# Patient Record
Sex: FEMALE | Race: WHITE
[De-identification: names, ages, dates, MRNs, and addresses within clinical notes are randomized per-mention and may not be internally consistent; named-entity substitution may affect disease eponyms.]

---

## 2020-11-23 ENCOUNTER — HOSPITAL ENCOUNTER (INPATIENT)
Dept: HOSPITAL 47 - EC | Age: 75
LOS: 2 days | Discharge: HOME | DRG: 641 | End: 2020-11-25
Attending: INTERNAL MEDICINE | Admitting: INTERNAL MEDICINE
Payer: MEDICARE

## 2020-11-23 DIAGNOSIS — T50.2X5A: ICD-10-CM

## 2020-11-23 DIAGNOSIS — Z88.8: ICD-10-CM

## 2020-11-23 DIAGNOSIS — N17.9: ICD-10-CM

## 2020-11-23 DIAGNOSIS — Z82.3: ICD-10-CM

## 2020-11-23 DIAGNOSIS — Z88.2: ICD-10-CM

## 2020-11-23 DIAGNOSIS — Z79.899: ICD-10-CM

## 2020-11-23 DIAGNOSIS — Z79.82: ICD-10-CM

## 2020-11-23 DIAGNOSIS — I95.1: ICD-10-CM

## 2020-11-23 DIAGNOSIS — E86.1: ICD-10-CM

## 2020-11-23 DIAGNOSIS — E87.1: Primary | ICD-10-CM

## 2020-11-23 DIAGNOSIS — E86.0: ICD-10-CM

## 2020-11-23 DIAGNOSIS — Z82.49: ICD-10-CM

## 2020-11-23 DIAGNOSIS — Z20.828: ICD-10-CM

## 2020-11-23 DIAGNOSIS — E78.5: ICD-10-CM

## 2020-11-23 DIAGNOSIS — N18.9: ICD-10-CM

## 2020-11-23 DIAGNOSIS — I12.9: ICD-10-CM

## 2020-11-23 LAB
ALBUMIN SERPL-MCNC: 5.2 G/DL (ref 3.5–5)
ALP SERPL-CCNC: 78 U/L (ref 38–126)
ALT SERPL-CCNC: 29 U/L (ref 4–34)
ANION GAP SERPL CALC-SCNC: 14 MMOL/L
ANION GAP SERPL CALC-SCNC: 8 MMOL/L
AST SERPL-CCNC: 34 U/L (ref 14–36)
BASOPHILS # BLD AUTO: 0 K/UL (ref 0–0.2)
BASOPHILS NFR BLD AUTO: 1 %
BUN SERPL-SCNC: 20 MG/DL (ref 7–17)
BUN SERPL-SCNC: 25 MG/DL (ref 7–17)
CALCIUM SPEC-MCNC: 10.5 MG/DL (ref 8.4–10.2)
CALCIUM SPEC-MCNC: 9.6 MG/DL (ref 8.4–10.2)
CHLORIDE SERPL-SCNC: 79 MMOL/L (ref 98–107)
CHLORIDE SERPL-SCNC: 88 MMOL/L (ref 98–107)
CO2 SERPL-SCNC: 22 MMOL/L (ref 22–30)
CO2 SERPL-SCNC: 25 MMOL/L (ref 22–30)
EOSINOPHIL # BLD AUTO: 0.1 K/UL (ref 0–0.7)
EOSINOPHIL NFR BLD AUTO: 1 %
ERYTHROCYTE [DISTWIDTH] IN BLOOD BY AUTOMATED COUNT: 3.98 M/UL (ref 3.8–5.4)
ERYTHROCYTE [DISTWIDTH] IN BLOOD: 11.4 % (ref 11.5–15.5)
GLUCOSE SERPL-MCNC: 109 MG/DL (ref 74–99)
GLUCOSE SERPL-MCNC: 134 MG/DL (ref 74–99)
HCT VFR BLD AUTO: 37.7 % (ref 34–46)
HGB BLD-MCNC: 13.5 GM/DL (ref 11.4–16)
INR PPP: 1 (ref ?–1.2)
KETONES UR QL STRIP.AUTO: (no result)
LYMPHOCYTES # SPEC AUTO: 0.9 K/UL (ref 1–4.8)
LYMPHOCYTES NFR SPEC AUTO: 16 %
MCH RBC QN AUTO: 33.9 PG (ref 25–35)
MCHC RBC AUTO-ENTMCNC: 35.8 G/DL (ref 31–37)
MCV RBC AUTO: 94.8 FL (ref 80–100)
MONOCYTES # BLD AUTO: 0.6 K/UL (ref 0–1)
MONOCYTES NFR BLD AUTO: 10 %
NEUTROPHILS # BLD AUTO: 4 K/UL (ref 1.3–7.7)
NEUTROPHILS NFR BLD AUTO: 71 %
PH UR: 5.5 [PH] (ref 5–8)
PLATELET # BLD AUTO: 269 K/UL (ref 150–450)
POTASSIUM SERPL-SCNC: 4.1 MMOL/L (ref 3.5–5.1)
POTASSIUM SERPL-SCNC: 4.7 MMOL/L (ref 3.5–5.1)
PROT SERPL-MCNC: 8.2 G/DL (ref 6.3–8.2)
PT BLD: 10 SEC (ref 9–12)
SODIUM SERPL-SCNC: 115 MMOL/L (ref 137–145)
SODIUM SERPL-SCNC: 121 MMOL/L (ref 137–145)
SP GR UR: 1 (ref 1–1.03)
UROBILINOGEN UR QL STRIP: <2 MG/DL (ref ?–2)
WBC # BLD AUTO: 5.6 K/UL (ref 3.8–10.6)

## 2020-11-23 PROCEDURE — 81003 URINALYSIS AUTO W/O SCOPE: CPT

## 2020-11-23 PROCEDURE — 80048 BASIC METABOLIC PNL TOTAL CA: CPT

## 2020-11-23 PROCEDURE — 83050 HGB METHEMOGLOBIN QUAN: CPT

## 2020-11-23 PROCEDURE — 84443 ASSAY THYROID STIM HORMONE: CPT

## 2020-11-23 PROCEDURE — 84300 ASSAY OF URINE SODIUM: CPT

## 2020-11-23 PROCEDURE — 84295 ASSAY OF SERUM SODIUM: CPT

## 2020-11-23 PROCEDURE — 83735 ASSAY OF MAGNESIUM: CPT

## 2020-11-23 PROCEDURE — 85025 COMPLETE CBC W/AUTO DIFF WBC: CPT

## 2020-11-23 PROCEDURE — 87635 SARS-COV-2 COVID-19 AMP PRB: CPT

## 2020-11-23 PROCEDURE — 84484 ASSAY OF TROPONIN QUANT: CPT

## 2020-11-23 PROCEDURE — 87502 INFLUENZA DNA AMP PROBE: CPT

## 2020-11-23 PROCEDURE — 80053 COMPREHEN METABOLIC PANEL: CPT

## 2020-11-23 PROCEDURE — 83930 ASSAY OF BLOOD OSMOLALITY: CPT

## 2020-11-23 PROCEDURE — 99285 EMERGENCY DEPT VISIT HI MDM: CPT

## 2020-11-23 PROCEDURE — 96361 HYDRATE IV INFUSION ADD-ON: CPT

## 2020-11-23 PROCEDURE — 84550 ASSAY OF BLOOD/URIC ACID: CPT

## 2020-11-23 PROCEDURE — 83935 ASSAY OF URINE OSMOLALITY: CPT

## 2020-11-23 PROCEDURE — 36415 COLL VENOUS BLD VENIPUNCTURE: CPT

## 2020-11-23 PROCEDURE — 85610 PROTHROMBIN TIME: CPT

## 2020-11-23 PROCEDURE — 71046 X-RAY EXAM CHEST 2 VIEWS: CPT

## 2020-11-23 PROCEDURE — 96360 HYDRATION IV INFUSION INIT: CPT

## 2020-11-23 PROCEDURE — 93005 ELECTROCARDIOGRAM TRACING: CPT

## 2020-11-23 RX ADMIN — EZETIMIBE SCH MG: 10 TABLET ORAL at 21:14

## 2020-11-23 RX ADMIN — ASPIRIN 325 MG ORAL TABLET SCH MG: 325 PILL ORAL at 21:14

## 2020-11-23 RX ADMIN — METOPROLOL SUCCINATE SCH MG: 25 TABLET, EXTENDED RELEASE ORAL at 14:41

## 2020-11-23 RX ADMIN — HEPARIN SODIUM SCH UNIT: 5000 INJECTION, SOLUTION INTRAVENOUS; SUBCUTANEOUS at 21:16

## 2020-11-23 NOTE — ED
General Adult HPI





- General


Chief complaint: Dizziness


Stated complaint: Heart Issues/SOB


Time Seen by Provider: 11/23/20 10:51


Source: patient, RN notes reviewed


Mode of arrival: ambulatory


Limitations: no limitations





- History of Present Illness


Initial comments: 





75-year-old female presents to the emergency department for chief complaint of 

weakness.  Patient states she has felt fatigued and weak for the past several 

days.  Patient reports that because she felt so tired she checked her blood 

pressure and it has been low.  She reports it is in the 60s systolic at home.  

Patient states she has been getting slightly lightheaded when standing up.  She 

denies nausea or vomiting.  Denies patient of a room spinning.  She denies 

fevers or chills.  No cough congestion sore throat.  Patient denies any cardiac 

history herself.  Patient reports she did not take her blood pressure medication

this morning which includes a beta blocker and lisinopril.  Patient has no other

complaints at this time including shortness of breath, chest pain, abdominal carter

n, nausea or vomiting, headache, or visual changes.





- Related Data


                                Home Medications











 Medication  Instructions  Recorded  Confirmed


 


Aspirin 325 mg PO HS 11/23/20 11/23/20


 


Ezetimibe [Zetia] 10 mg PO HS 11/23/20 11/23/20


 


Lisinopril-Hctz 10-12.5 mg 1 tab PO DAILY 11/23/20 11/23/20





[Zestoretic 10-12.5]   


 


Metoprolol Succinate (ER) [Toprol 25 mg PO DAILY 11/23/20 11/23/20





Xl]   











                                    Allergies











Allergy/AdvReac Type Severity Reaction Status Date / Time


 


Statins-Hmg-Coa Reductase AdvReac  muscle Verified 11/23/20 12:50





Inhibitor   cramps  


 


Sulfa (Sulfonamide AdvReac  Nausea Verified 11/23/20 12:50





Antibiotics)     














Review of Systems


ROS Statement: 


Those systems with pertinent positive or pertinent negative responses have been 

documented in the HPI.





ROS Other: All systems not noted in ROS Statement are negative.





Past Medical History


Past Medical History: Hyperlipidemia, Hypertension


History of Any Multi-Drug Resistant Organisms: None Reported


Past Surgical History: No Surgical Hx Reported


Past Psychological History: No Psychological Hx Reported


Smoking Status: Never smoker


Past Alcohol Use History: Occasional


Past Drug Use History: None Reported





General Exam


Limitations: no limitations


General appearance: alert, in no apparent distress


Head exam: Present: atraumatic, normocephalic, normal inspection


Eye exam: Present: normal appearance, PERRL, EOMI.  Absent: scleral icterus, 

conjunctival injection, periorbital swelling


ENT exam: Present: normal exam, mucous membranes moist


Neck exam: Present: normal inspection, full ROM.  Absent: tenderness, 

meningismus, lymphadenopathy


Respiratory exam: Present: normal lung sounds bilaterally.  Absent: respiratory 

distress, wheezes, rales, rhonchi, stridor


Cardiovascular Exam: Present: regular rate, normal rhythm, normal heart sounds. 

Absent: systolic murmur, diastolic murmur, rubs, gallop, clicks


GI/Abdominal exam: Present: soft, normal bowel sounds.  Absent: distended, 

tenderness, guarding, rebound, rigid





Course


                                   Vital Signs











  11/23/20 11/23/20





  10:42 11:30


 


Temperature 97.3 F L 


 


Pulse Rate 74 


 


Pulse Rate [  65





Pulse Oximetery  





]  


 


Respiratory 18 





Rate  


 


Blood Pressure 143/84 


 


Blood Pressure  132/71





[Left Arm  





Sitting]  


 


Blood Pressure  116/75





[Left Arm  





Standing]  


 


Blood Pressure  151/76





[Left Arm  





Supine]  


 


O2 Sat by Pulse 100 96





Oximetry  














EKG Findings





- EKG Comments:


EKG Findings:: Normal sinus rhythm, ventricular rate 65, HI interval 134, QTC 

405





Medical Decision Making





- Medical Decision Making





Vitals are stable, orthostatic blood pressures are normal.  CBC is unremarkable.

 CMP does show severe hyponatremia with a sodium of 1:15.  Patient reports she 

has been drinking a lot of water and has been very thirsty.  States she has not 

otherwise had an appetite.  She does not have diarrhea.  At this time patient 

will be admitted for hyponatremia.  She was given a 500 mL bolus here in the 

emergency room.  It was ordered as a liter however stopped half way through 1 

sodium resulted.  She will be gently rehydrated with 75 mL of normal saline per 

hour





- Lab Data


Result diagrams: 


                                 11/23/20 11:20 11/23/20 11:20


                                   Lab Results











  11/23/20 11/23/20 11/23/20 Range/Units





  11:20 11:20 11:20 


 


WBC  5.6    (3.8-10.6)  k/uL


 


RBC  3.98    (3.80-5.40)  m/uL


 


Hgb  13.5    (11.4-16.0)  gm/dL


 


Hct  37.7    (34.0-46.0)  %


 


MCV  94.8    (80.0-100.0)  fL


 


MCH  33.9    (25.0-35.0)  pg


 


MCHC  35.8    (31.0-37.0)  g/dL


 


RDW  11.4 L    (11.5-15.5)  %


 


Plt Count  269    (150-450)  k/uL


 


MPV  6.7    


 


Neutrophils %  71    %


 


Lymphocytes %  16    %


 


Monocytes %  10    %


 


Eosinophils %  1    %


 


Basophils %  1    %


 


Neutrophils #  4.0    (1.3-7.7)  k/uL


 


Lymphocytes #  0.9 L    (1.0-4.8)  k/uL


 


Monocytes #  0.6    (0-1.0)  k/uL


 


Eosinophils #  0.1    (0-0.7)  k/uL


 


Basophils #  0.0    (0-0.2)  k/uL


 


PT   10.0   (9.0-12.0)  sec


 


INR   1.0   (<1.2)  


 


Sodium    115 L*  (137-145)  mmol/L


 


Potassium    4.7  (3.5-5.1)  mmol/L


 


Chloride    79 L  ()  mmol/L


 


Carbon Dioxide    22  (22-30)  mmol/L


 


Anion Gap    14  mmol/L


 


BUN    25 H  (7-17)  mg/dL


 


Creatinine    1.37 H  (0.52-1.04)  mg/dL


 


Est GFR (CKD-EPI)AfAm    44  (>60 ml/min/1.73 sqM)  


 


Est GFR (CKD-EPI)NonAf    38  (>60 ml/min/1.73 sqM)  


 


Glucose    109 H  (74-99)  mg/dL


 


Calcium    10.5 H  (8.4-10.2)  mg/dL


 


Total Bilirubin    1.1  (0.2-1.3)  mg/dL


 


AST    34  (14-36)  U/L


 


ALT    29  (4-34)  U/L


 


Alkaline Phosphatase    78  ()  U/L


 


Troponin I     (0.000-0.034)  ng/mL


 


Total Protein    8.2  (6.3-8.2)  g/dL


 


Albumin    5.2 H  (3.5-5.0)  g/dL














  11/23/20 Range/Units





  11:20 


 


WBC   (3.8-10.6)  k/uL


 


RBC   (3.80-5.40)  m/uL


 


Hgb   (11.4-16.0)  gm/dL


 


Hct   (34.0-46.0)  %


 


MCV   (80.0-100.0)  fL


 


MCH   (25.0-35.0)  pg


 


MCHC   (31.0-37.0)  g/dL


 


RDW   (11.5-15.5)  %


 


Plt Count   (150-450)  k/uL


 


MPV   


 


Neutrophils %   %


 


Lymphocytes %   %


 


Monocytes %   %


 


Eosinophils %   %


 


Basophils %   %


 


Neutrophils #   (1.3-7.7)  k/uL


 


Lymphocytes #   (1.0-4.8)  k/uL


 


Monocytes #   (0-1.0)  k/uL


 


Eosinophils #   (0-0.7)  k/uL


 


Basophils #   (0-0.2)  k/uL


 


PT   (9.0-12.0)  sec


 


INR   (<1.2)  


 


Sodium   (137-145)  mmol/L


 


Potassium   (3.5-5.1)  mmol/L


 


Chloride   ()  mmol/L


 


Carbon Dioxide   (22-30)  mmol/L


 


Anion Gap   mmol/L


 


BUN   (7-17)  mg/dL


 


Creatinine   (0.52-1.04)  mg/dL


 


Est GFR (CKD-EPI)AfAm   (>60 ml/min/1.73 sqM)  


 


Est GFR (CKD-EPI)NonAf   (>60 ml/min/1.73 sqM)  


 


Glucose   (74-99)  mg/dL


 


Calcium   (8.4-10.2)  mg/dL


 


Total Bilirubin   (0.2-1.3)  mg/dL


 


AST   (14-36)  U/L


 


ALT   (4-34)  U/L


 


Alkaline Phosphatase   ()  U/L


 


Troponin I  <0.012  (0.000-0.034)  ng/mL


 


Total Protein   (6.3-8.2)  g/dL


 


Albumin   (3.5-5.0)  g/dL














Disposition


Clinical Impression: 


 Hyponatremia, Weakness





Disposition: ADMITTED AS IP TO THIS HOSP


Condition: Fair


Is patient prescribed a controlled substance at d/c from ED?: No


Referrals: 


Karie Flanagan MD [Primary Care Provider] - 1-2 days


Time of Disposition: 12:53

## 2020-11-23 NOTE — P.HPIM
History of Present Illness


H&P Date: 11/23/20


Chief Complaint: Generalized weakness





This is a 75-year-old female with past medical history noted below who presented

to the emergency room with generalized weakness.  Patient said that this is 

being going on for a few days as she's been feeling tired and fatigued.  She den

ies any fever or chills.  No flulike symptoms.  She is having a very poor 

appetite and barely eating as much.  She said that she tried to drink a lot of 

water to keep herself hydrated.  She denies any recent sick contact.  No known 

covid19 exposure.  Patient reports feeling slightly dizzy but otherwise denies 

any chest pain, nausea or vomiting, or diarrhea.  She denies alcohol drinking.  

Patient was evaluated in the car was found to have evidence of dehydration with 

a sodium level of 1:15.  She will be admitted to the hospital for further 

management.





Review of Systems





Review of system:


14 points review of systems were obtained and were negative except to what were 

mentioned in the HPI.





Past Medical History


Past Medical History: Hyperlipidemia, Hypertension


History of Any Multi-Drug Resistant Organisms: None Reported


Past Surgical History: No Surgical Hx Reported


Past Psychological History: No Psychological Hx Reported


Smoking Status: Never smoker


Past Alcohol Use History: Occasional


Past Drug Use History: None Reported





Medications and Allergies


                                Home Medications











 Medication  Instructions  Recorded  Confirmed  Type


 


Aspirin 325 mg PO HS 11/23/20 11/23/20 History


 


Ezetimibe [Zetia] 10 mg PO HS 11/23/20 11/23/20 History


 


Lisinopril-Hctz 10-12.5 mg 1 tab PO DAILY 11/23/20 11/23/20 History





[Zestoretic 10-12.5]    


 


Metoprolol Succinate (ER) [Toprol 25 mg PO DAILY 11/23/20 11/23/20 History





Xl]    








                                    Allergies











Allergy/AdvReac Type Severity Reaction Status Date / Time


 


Statins-Hmg-Coa Reductase AdvReac  muscle Verified 11/23/20 12:50





Inhibitor   cramps  


 


Sulfa (Sulfonamide AdvReac  Nausea Verified 11/23/20 12:50





Antibiotics)     














Physical Exam


Vitals: 


                                   Vital Signs











  Temp Pulse Pulse Resp BP BP BP


 


 11/23/20 13:51   65   18  138/74  


 


 11/23/20 12:57   63   18  147/70  


 


 11/23/20 11:30    65    132/71  116/75


 


 11/23/20 10:42  97.3 F L  74   18  143/84  














  BP Pulse Ox


 


 11/23/20 13:51   100


 


 11/23/20 12:57   98


 


 11/23/20 11:30  151/76  96


 


 11/23/20 10:42   100








                                Intake and Output











 11/22/20 11/23/20 11/23/20





 22:59 06:59 14:59


 


Other:   


 


  Weight   56.245 kg














General:  The patient is awake and alert, in no distress


Eye: there is normal conjunctiva bilaterally.  


Neck:  The neck is supple, there is no  JVD.  


Cardiovascular:  Normal  S1-S2, no S3-S4, no murmurs.


Respiratory: Lungs clear to auscultation bilaterally


Gastrointestinal: Abdomen is soft, nontender


Musculoskeletal:  There is no pedal edema.  


Neurological:. Speech is normal. 


Skin:  Skin is warm and dry 





Results


CBC & Chem 7: 


                                 11/23/20 11:20





                                 11/23/20 11:20


Labs: 


                  Abnormal Lab Results - Last 24 Hours (Table)











  11/23/20 11/23/20 11/23/20 Range/Units





  11:20 11:20 12:50 


 


RDW  11.4 L    (11.5-15.5)  %


 


Lymphocytes #  0.9 L    (1.0-4.8)  k/uL


 


Sodium   115 L*   (137-145)  mmol/L


 


Chloride   79 L   ()  mmol/L


 


BUN   25 H   (7-17)  mg/dL


 


Creatinine   1.37 H   (0.52-1.04)  mg/dL


 


Glucose   109 H   (74-99)  mg/dL


 


Calcium   10.5 H   (8.4-10.2)  mg/dL


 


Albumin   5.2 H   (3.5-5.0)  g/dL


 


Urine Ketones    1+ H  (Negative)  














Assessment and Plan


Assessment: 





1.  Hypovolemic hyponatremia: Secondary to dehydration and hydrochlorothiazide 

use.  Received 1 L of normal saline in the ER.  Continue normal saline at 75 mL 

per hour.  Consult nephrology for further evaluation.  Repeat BMP every 4 hours.

 Seizure precaution.





2.  Acute kidney injury, mild





3.  Essential hypertension: Blood pressure within acceptable range.  Discontinue

hydrochlorothiazide.  Hold lisinopril.  Continue metoprolol for now.





4.  Mild orthostatic hypotension on presentation





5.  DVT prophylaxis with subcu heparin











The patient is admitted with an anticipated greater than 2 midnight stay for 

evaluation of the medical problems noted above


Discussed with: Patient and nursing staff


Anticipated discharge date: To be determined based on clinical course


Anticipated discharge place: home


A total of 45 minutes was spent on the care of this complex patient more than 

50% of the time was spent in counseling and care coordination.

## 2020-11-24 LAB
ANION GAP SERPL CALC-SCNC: 9 MMOL/L
BUN SERPL-SCNC: 21 MG/DL (ref 7–17)
CALCIUM SPEC-MCNC: 9.5 MG/DL (ref 8.4–10.2)
CHLORIDE SERPL-SCNC: 90 MMOL/L (ref 98–107)
CO2 SERPL-SCNC: 24 MMOL/L (ref 22–30)
GLUCOSE SERPL-MCNC: 92 MG/DL (ref 74–99)
MAGNESIUM SPEC-SCNC: 2.1 MG/DL (ref 1.6–2.3)
POTASSIUM SERPL-SCNC: 4.1 MMOL/L (ref 3.5–5.1)
SODIUM SERPL-SCNC: 123 MMOL/L (ref 137–145)
URATE SERPL-MCNC: 7 MG/DL (ref 3.7–7.4)

## 2020-11-24 RX ADMIN — ASPIRIN 325 MG ORAL TABLET SCH MG: 325 PILL ORAL at 20:11

## 2020-11-24 RX ADMIN — METOPROLOL SUCCINATE SCH MG: 25 TABLET, EXTENDED RELEASE ORAL at 07:52

## 2020-11-24 RX ADMIN — HEPARIN SODIUM SCH UNIT: 5000 INJECTION, SOLUTION INTRAVENOUS; SUBCUTANEOUS at 20:11

## 2020-11-24 RX ADMIN — EZETIMIBE SCH MG: 10 TABLET ORAL at 20:11

## 2020-11-24 RX ADMIN — HEPARIN SODIUM SCH: 5000 INJECTION, SOLUTION INTRAVENOUS; SUBCUTANEOUS at 07:51

## 2020-11-24 NOTE — P.PN
Subjective


Progress Note Date: 11/24/20


Principal diagnosis: 





Feels better less fatigue





Objective





- Vital Signs


Vital signs: 


                                   Vital Signs











Temp  97.9 F   11/24/20 12:00


 


Pulse  65   11/24/20 12:00


 


Resp  17   11/24/20 12:00


 


BP  138/70   11/24/20 12:00


 


Pulse Ox  99   11/24/20 12:00








                                 Intake & Output











 11/23/20 11/24/20 11/24/20





 18:59 06:59 18:59


 


Weight 56.245 kg  


 


Other:   


 


  Voiding Method Toilet Toilet Toilet


 


  # Voids 2 1 


 


  # Bowel Movements 1  














- Constitutional


General appearance: Present: no acute distress





- Respiratory


Respiratory: bilateral: CTA





- Cardiovascular


Rhythm: regular





- Gastrointestinal


General gastrointestinal: Present: normal bowel sounds





- Integumentary


Integumentary: Present: normal





- Labs


CBC & Chem 7: 


                                 11/23/20 11:20





                                 11/24/20 12:29


Labs: 


                  Abnormal Lab Results - Last 24 Hours (Table)











  11/23/20 11/23/20 11/24/20 Range/Units





  20:22 20:22 01:07 


 


Sodium   121 L  121 L  (137-145)  mmol/L


 


Chloride   88 L   ()  mmol/L


 


BUN   20 H   (7-17)  mg/dL


 


Creatinine   1.13 H   (0.52-1.04)  mg/dL


 


Glucose   134 H   (74-99)  mg/dL


 


Osmolality  262 L    (280-301)  mosm/kg














  11/24/20 11/24/20 Range/Units





  05:39 12:29 


 


Sodium  123 L  126 L  (137-145)  mmol/L


 


Chloride  90 L   ()  mmol/L


 


BUN  21 H   (7-17)  mg/dL


 


Creatinine  1.19 H   (0.52-1.04)  mg/dL


 


Glucose    (74-99)  mg/dL


 


Osmolality    (280-301)  mosm/kg














Assessment and Plan


(1) Hyponatremia


Narrative/Plan: 


appreciate Nephrology input, patient's fatigue and weakness better, Saline disco

ntinued improved appetite, no Thiazides in the future 


Current Visit: Yes   Status: Acute   Code(s): E87.1 - HYPO-OSMOLALITY AND 

HYPONATREMIA   SNOMED Code(s): 39701361


   





(2) HTN (hypertension)


Narrative/Plan: 


The patient was on a combination pill, now Thiazide discontinued BP controlled 


Current Visit: Yes   Status: Acute   Code(s): I10 - ESSENTIAL (PRIMARY) 

HYPERTENSION   SNOMED Code(s): 65812374


   





(3) Weakness


Narrative/Plan: 


improving check TSH


Current Visit: Yes   Status: Acute   Code(s): R53.1 - WEAKNESS   SNOMED Code(s):

61185299

## 2020-11-24 NOTE — P.NPCON
History of Present Illness





- Reason for Consult


hyponatremia





- History of Present Illness





Reason for consultation: Hyponatremia





History of present illness:


Patient is a 75-year-old female seen in renal consultation for hyponatremia.  

Patient's sodium level on admission yesterday at 11:20 AM was 115.  She received

a 1 L bolus of normal saline and was then started on normal saline at 75 mL an 

hour for maintenance fluids.  Sodium level yesterday at 8 PM was 121 and IV 

fluids were stopped.  Sodium level this morning was 123.  Patient presented to 

the hospital due to fatigue.  Patient was worried about cardiac event due to 

strong family history and was advised by her cardiologist to come to the [facility].  She denies any chest pain or shortness of breath.  Patient states oral 

intake was quite poor the last few days.  Additionally she was also on 

hydrochlorothiazide outpatient.  She denies any vomiting or diarrhea but did 

feel nauseous.  She denies any history of malignancy.  Good urine output.  No 

hematuria or dysuria.  She's also been drinking 4-5 12 ounce glasses of water 

daily along with 8-10 ounces of tea daily.  Oral intake is now starting to 

improve.  No edema.  Blood pressure controlled.





Vital signs are stable.


General: The patient appeared well nourished and normally developed. 


HEENT: Head exam is unremarkable. Neck is without jugular venous distension.


LUNGS: Lungs are clear to auscultation and percussion. Breath sounds decreased.


HEART: Rate and Rhythm are regular. 


ABDOMEN: Soft, nontender.  


EXTREMITITES: No clubbing, cyanosis, or edema.





Past Medical History


Past Medical History: Hyperlipidemia, Hypertension


History of Any Multi-Drug Resistant Organisms: None Reported


Past Surgical History: No Surgical Hx Reported


Past Anesthesia/Blood Transfusion Reactions: No Reported Reaction


Smoking Status: Never smoker





- Past Family History


  ** Mother


Family Medical History: CVA/TIA





  ** Father


Family Medical History: Coronary Artery Disease (CAD)





Medications and Allergies


                                Home Medications











 Medication  Instructions  Recorded  Confirmed  Type


 


Aspirin 325 mg PO HS 11/23/20 11/23/20 History


 


Ezetimibe [Zetia] 10 mg PO HS 11/23/20 11/23/20 History


 


Lisinopril-Hctz 10-12.5 mg 1 tab PO DAILY 11/23/20 11/23/20 History





[Zestoretic 10-12.5]    


 


Metoprolol Succinate (ER) [Toprol 25 mg PO DAILY 11/23/20 11/23/20 History





Xl]    








                                    Allergies











Allergy/AdvReac Type Severity Reaction Status Date / Time


 


Statins-Hmg-Coa Reductase AdvReac  muscle Verified 11/23/20 12:50





Inhibitor   cramps  


 


Sulfa (Sulfonamide AdvReac  Nausea Verified 11/23/20 12:50





Antibiotics)     














Physical Exam


Vitals: 


                                   Vital Signs











  Temp Pulse Pulse Resp BP BP BP


 


 11/24/20 07:51    63    


 


 11/24/20 07:00  97.8 F   56 L  18    108/66


 


 11/24/20 05:00  97.7 F   55 L  16    103/61


 


 11/23/20 22:22  97.9 F   62  16   


 


 11/23/20 15:00  98.1 F   62  18   


 


 11/23/20 14:45  97.6 F  69   18  140/53  


 


 11/23/20 13:51   65   18  138/74  


 


 11/23/20 12:57   63   18  147/70  


 


 11/23/20 11:30    65    132/71  116/75














  BP BP Pulse Ox


 


 11/24/20 07:51   128/77  100


 


 11/24/20 07:00    99


 


 11/24/20 05:00    95


 


 11/23/20 22:22  122/70   100


 


 11/23/20 15:00  114/77   100


 


 11/23/20 14:45    98


 


 11/23/20 13:51    100


 


 11/23/20 12:57    98


 


 11/23/20 11:30  151/76   96








                                Intake and Output











 11/23/20 11/24/20 11/24/20





 22:59 06:59 14:59


 


Other:   


 


  Voiding Method Toilet  Toilet


 


  # Voids 1 1 


 


  Weight 56.245 kg  














Results





- Lab Results


                             Most recent lab results











Calcium  9.5 mg/dL (8.4-10.2)   11/24/20  05:39    














                                 11/23/20 11:20





                                 11/24/20 05:39





Assessment and Plan


Plan: 





Assessment: 


1.  Hypovolemic hyponatremia improved with normal saline.  Also component of 

poor solute intake.  Further worsened with the use of Hytrin to thiazide.  

Sodium level 115 on admission yesterday and was 123 this morning.  Urine sodium 

24 and urine osmolality 149.


2.  Benign hypertension.  Controlled.


3.  Mild acute kidney injury improved with IV hydration.  UA benign.


4.  Rule out chronic kidney disease.  Creatinine in August 2020 was 1.0.





Plan:


Encourage oral intake.


Discontinue fluid restriction as appetite is better.


Avoid thiazide diuretics in the future.


Check TSH.


Check sodium level now.





Thank you for the consultation.  I will continue to follow the patient with you 

during her hospital stay.

## 2020-11-25 VITALS
DIASTOLIC BLOOD PRESSURE: 69 MMHG | HEART RATE: 77 BPM | SYSTOLIC BLOOD PRESSURE: 119 MMHG | TEMPERATURE: 98 F | RESPIRATION RATE: 16 BRPM

## 2020-11-25 LAB
ANION GAP SERPL CALC-SCNC: 10.9 MMOL/L (ref 4–12)
BUN SERPL-SCNC: 23 MG/DL (ref 9–27)
BUN/CREAT SERPL: 16.43 RATIO (ref 12–20)
CALCIUM SPEC-MCNC: 10 MG/DL (ref 8.7–10.3)
CHLORIDE SERPL-SCNC: 92 MMOL/L (ref 96–109)
CO2 SERPL-SCNC: 23.1 MMOL/L (ref 21.6–31.8)
GLUCOSE SERPL-MCNC: 98 MG/DL (ref 70–110)
POTASSIUM SERPL-SCNC: 4.5 MMOL/L (ref 3.5–5.5)
SODIUM SERPL-SCNC: 126 MMOL/L (ref 135–145)

## 2020-11-25 RX ADMIN — METOPROLOL SUCCINATE SCH MG: 25 TABLET, EXTENDED RELEASE ORAL at 08:56

## 2020-11-25 RX ADMIN — HEPARIN SODIUM SCH UNIT: 5000 INJECTION, SOLUTION INTRAVENOUS; SUBCUTANEOUS at 08:56

## 2020-11-25 NOTE — P.PN
Subjective





Patient is seen in follow-up for hyponatremia.  She did receive D5W for about 5 

hours yesterday the sodium correction of sodium level.  Sodium level is stable 

at 126 this morning.  Her oral intake is good.  No vomiting or diarrhea.





Vital signs are stable.


General: The patient appeared well nourished and normally developed. 


HEENT: Head exam is unremarkable. Neck is without jugular venous distension.


LUNGS: Lungs are clear to auscultation and percussion. Breath sounds decreased.


HEART: Rate and Rhythm are regular. 


ABDOMEN: Soft, nontender.


EXTREMITITES: No clubbing, cyanosis, or edema.





Objective





- Vital Signs


Vital signs: 


                                   Vital Signs











Temp  97.6 F   11/25/20 05:49


 


Pulse  57 L  11/25/20 05:49


 


Resp  18   11/25/20 05:49


 


BP  113/72   11/25/20 05:49


 


Pulse Ox  100   11/25/20 05:49








                                 Intake & Output











 11/24/20 11/25/20 11/25/20





 18:59 06:59 18:59


 


Intake Total 100  


 


Balance 100  


 


Weight   59 kg


 


Intake:   


 


  Intake, IV Titration 100  





  Amount   


 


    Dextrose 5% in Water 1, 100  





    000 ml @ 100 mls/hr IV .   





    Q10H ONE Rx#:[id number]   


 


Other:   


 


  Voiding Method Toilet Toilet 


 


  # Voids 1 2 














- Labs


CBC & Chem 7: 


                                 11/23/20 11:20





                                 11/25/20 05:48


Labs: 


                  Abnormal Lab Results - Last 24 Hours (Table)











  11/24/20 11/24/20 11/25/20 Range/Units





  12:29 16:30 05:48 


 


Sodium  126 L  125 L  126 L  (137-145)  mmol/L


 


Chloride    92 L  ()  mmol/L


 


Est GFR (CKD-EPI)AfAm    42.5 L  (60.0-200.0)   


 


Est GFR (CKD-EPI)NonAf    36.7 L  (60.0-200.0)   














Assessment and Plan


Plan: 





Assessment: 


1.  Hypovolemic hyponatremia improved with normal saline.  Also component of 

poor solute intake.  Further worsened with the use of Hydrochlorothiazide.  

Sodium level 115 on admission yesterday and 126 this morning. Urine sodium 24 

and urine osmolality 149.  TSH normal.


2.  Benign hypertension.  Controlled.


3.  Mild acute kidney injury initially improved with IV hydration.  UA benign.


4.  Rule out chronic kidney disease.  Creatinine in August 2020 was 1.0 and has 

been in the range of 1.13-1.4 this admission.





Plan:


Encouraged oral intake.


Avoid thiazide diuretics in the future.


Repeat sodium level this afternoon.  If improving, she can be discharged with 

repeat BMP to be checked 2-3 days post discharge.


I also advised her to maintain a fluid restriction of less than 40 ounces per 

day.

## 2020-11-25 NOTE — P.DS
Providers


Date of admission: 


11/23/20 12:59





Expected date of discharge: 11/25/20


Attending physician: 


Mckenna Garcia





Consults: 





                                        





11/23/20 13:51


Consult Physician Routine 


   Consulting Provider: Callie Vaughn


   Consult Reason/Comments: hyponatremia


   Do you want consulting provider notified?: Yes











Primary care physician: 


Karie Flanagan MD





Hospital Course: 


Discharge Diagnosis:


-Hypovolemic hyponatremia, severe and symptomatic


-Acute kidney injury


-Dehydration


-Possible underlying chronic kidney disease


-Orthostatic hypotension on presentation, resolved


-Essential hypertension





Hospital Course: 


Patient is a 75-year-old female with a history of hypertension, dyslipidemia who

presented to the emergency room with generalized weakness.  In the ER she 

underwent an extensive evaluation was found to have severe hypovolemic 

hyponatremia felt to be secondary to hydrochlorothiazide and dehydration.  She 

was started on IV fluids and given a 1 L bolus.  She was admitted for further 

monitoring nephrology was consulted.  Her sodium was monitored closely.  

Continue to increase.  Her blood pressure remained stable.  Her weakness 

resolved and she was in taking good amounts.  She was determined stable for 

discharge home.  Case was discussed with nephrology and she will have follow-up 

basic metabolic profile in 2 days.  She'll follow up with her primary care 

physician on 12/3 and with nephrology in 2 weeks.  She'll stay off her 

hydrochlorothiazide and lisinopril.  She'll continue her metoprolol.  She will 

also monitor her blood pressure once daily and take a log to her primary care 

follow-up.








Patient seen and examined at bedside.  No chest pain, shortness breath, nausea, 

vomiting, or lightheadedness.  Her weakness is almost completely resolved.





Vital signs reviewed and stable. 


General: non toxic, no distress, appears at stated age


Derm: warm, dry


Head: atraumatic, normocephalic, symmetric


Eyes: EOMI, no lid lag, anicteric sclera


Mouth: no lip lesion, mucus membranes moist


Cardiovascular: S1S2 reg, no murmur, positive posterior tibial pulse bilateral, 


Lungs: CTA bilateral, no rhonchi, no rales , no accessory muscle use


Abdominal: soft,  nontender to palpation, no guarding, no appreciable 

organomegaly


Ext: no gross muscle atrophy, no edema, no contractures


Neuro:  CN II-XI grossly intact, no focal neuro deficits


Psych: Alert, oriented, appropriate affect 








A total of 37 minutes of time were spent preparing this complex discharge 

summary .





Patient Condition at Discharge: Fair





Plan - Discharge Summary


Discharge Rx Participant: No


New Discharge Prescriptions: 


Continue


   Aspirin 325 mg PO HS


   Metoprolol Succinate (ER) [Toprol XL] 25 mg PO DAILY


   Ezetimibe [Zetia] 10 mg PO HS





Discontinued


   Lisinopril-Hctz 10-12.5 mg [Zestoretic 10-12.5] 1 tab PO DAILY


Discharge Medication List





Aspirin 325 mg PO HS 11/23/20 [History]


Ezetimibe [Zetia] 10 mg PO HS 11/23/20 [History]


Metoprolol Succinate (ER) [Toprol XL] 25 mg PO DAILY 11/23/20 [History]








Follow up Appointment(s)/Referral(s): 


Karie Flanagan MD [Primary Care Provider] - 12/03/20 3:45 pm (this visit will 

be virtual,  Drs office will call you the day before and confirm appointment 

with you and wall you through the details of the virtual visit)


Jona Alvarez DO [STAFF PHYSICIAN] - 2 Weeks


Ambulatory/Diagnostic Orders: 


Basic Metabolic Panel [LAB.AMB] Time Frame: 2 Days, Location: None Selected


Activity/Diet/Wound Care/Special Instructions: 


Activity:


as tolerated 





Diet: 


Heart Healthy 





Special Instructions: 


Labs on Friday 11/27 


Take blood pressure once daily and make a chart

## 2020-11-27 ENCOUNTER — HOSPITAL ENCOUNTER (OUTPATIENT)
Dept: HOSPITAL 47 - LABWHC1 | Age: 75
Discharge: HOME | End: 2020-11-27
Attending: INTERNAL MEDICINE
Payer: MEDICARE

## 2020-11-27 DIAGNOSIS — E87.1: Primary | ICD-10-CM

## 2020-11-27 LAB
ANION GAP SERPL CALC-SCNC: 9.1 MMOL/L (ref 4–12)
BUN SERPL-SCNC: 23 MG/DL (ref 9–27)
BUN/CREAT SERPL: 17.69 RATIO (ref 12–20)
CALCIUM SPEC-MCNC: 10.4 MG/DL (ref 8.7–10.3)
CHLORIDE SERPL-SCNC: 98 MMOL/L (ref 96–109)
CO2 SERPL-SCNC: 26.9 MMOL/L (ref 21.6–31.8)
GLUCOSE SERPL-MCNC: 106 MG/DL (ref 70–110)
POTASSIUM SERPL-SCNC: 4.4 MMOL/L (ref 3.5–5.5)
SODIUM SERPL-SCNC: 134 MMOL/L (ref 135–145)

## 2020-11-27 PROCEDURE — 36415 COLL VENOUS BLD VENIPUNCTURE: CPT

## 2020-11-27 PROCEDURE — 80048 BASIC METABOLIC PNL TOTAL CA: CPT

## 2021-10-14 ENCOUNTER — HOSPITAL ENCOUNTER (OUTPATIENT)
Dept: HOSPITAL 47 - LABWHC1 | Age: 76
Discharge: HOME | End: 2021-10-14
Attending: INTERNAL MEDICINE
Payer: MEDICARE

## 2021-10-14 DIAGNOSIS — N18.31: Primary | ICD-10-CM

## 2021-10-14 LAB
ANION GAP SERPL CALC-SCNC: 12.8 MMOL/L (ref 4–12)
BUN SERPL-SCNC: 14 MG/DL (ref 9–27)
BUN/CREAT SERPL: 17.05 RATIO (ref 12–20)
CALCIUM SPEC-MCNC: 9.8 MG/DL (ref 8.7–10.3)
CHLORIDE SERPL-SCNC: 97 MMOL/L (ref 96–109)
CO2 SERPL-SCNC: 23.5 MMOL/L (ref 21.6–31.8)
GLUCOSE SERPL-MCNC: 105 MG/DL (ref 70–110)
POTASSIUM SERPL-SCNC: 5 MMOL/L (ref 3.5–5.5)
SODIUM SERPL-SCNC: 133 MMOL/L (ref 135–145)

## 2021-10-14 PROCEDURE — 36415 COLL VENOUS BLD VENIPUNCTURE: CPT

## 2021-10-14 PROCEDURE — 80048 BASIC METABOLIC PNL TOTAL CA: CPT

## 2023-09-06 ENCOUNTER — HOSPITAL ENCOUNTER (OUTPATIENT)
Dept: HOSPITAL 47 - LABWHC1 | Age: 78
Discharge: HOME | End: 2023-09-06
Attending: STUDENT IN AN ORGANIZED HEALTH CARE EDUCATION/TRAINING PROGRAM
Payer: MEDICARE

## 2023-09-06 DIAGNOSIS — E78.5: Primary | ICD-10-CM

## 2023-09-06 LAB
ALBUMIN SERPL-MCNC: 4.8 D/DL (ref 3.8–4.9)
ALBUMIN/GLOB SERPL: 2.29 RATIO (ref 1.6–3.17)
ALP SERPL-CCNC: 62 U/L (ref 41–126)
ALT SERPL-CCNC: 18 U/L (ref 8–44)
ANION GAP SERPL CALC-SCNC: 13.1 MMOL/L (ref 4–12)
AST SERPL-CCNC: 22 U/L (ref 13–35)
BUN SERPL-SCNC: 21.2 MG/DL (ref 9–27)
BUN/CREAT SERPL: 26.5 RATIO (ref 12–20)
CALCIUM SPEC-MCNC: 10.1 MG/DL (ref 8.7–10.3)
CHLORIDE SERPL-SCNC: 101 MMOL/L (ref 96–109)
CHOLEST SERPL-MCNC: 231 MG/DL (ref 0–200)
CO2 SERPL-SCNC: 23.9 MMOL/L (ref 21.6–31.8)
GLOBULIN SER CALC-MCNC: 2.1 D/DL (ref 1.6–3.3)
GLUCOSE SERPL-MCNC: 99 MG/DL (ref 70–110)
HDLC SERPL-MCNC: 80.1 MG/DL (ref 40–60)
LDLC SERPL CALC-MCNC: 122.7 MG/DL (ref 0–131)
POTASSIUM SERPL-SCNC: 4.6 MMOL/L (ref 3.5–5.5)
PROT SERPL-MCNC: 6.9 D/DL (ref 6.2–8.2)
SODIUM SERPL-SCNC: 138 MMOL/L (ref 135–145)
TRIGL SERPL-MCNC: 141 MG/DL (ref 0–149)
VLDLC SERPL CALC-MCNC: 28.2 MG/DL (ref 5–40)

## 2023-09-06 PROCEDURE — 80053 COMPREHEN METABOLIC PANEL: CPT

## 2023-09-06 PROCEDURE — 80061 LIPID PANEL: CPT

## 2023-09-06 PROCEDURE — 36415 COLL VENOUS BLD VENIPUNCTURE: CPT
